# Patient Record
Sex: FEMALE | Race: ASIAN | NOT HISPANIC OR LATINO | URBAN - METROPOLITAN AREA
[De-identification: names, ages, dates, MRNs, and addresses within clinical notes are randomized per-mention and may not be internally consistent; named-entity substitution may affect disease eponyms.]

---

## 2017-04-21 ENCOUNTER — EMERGENCY (EMERGENCY)
Facility: HOSPITAL | Age: 42
LOS: 1 days | Discharge: HOME | End: 2017-04-21
Attending: EMERGENCY MEDICINE | Admitting: EMERGENCY MEDICINE
Payer: COMMERCIAL

## 2017-04-21 VITALS
OXYGEN SATURATION: 99 % | RESPIRATION RATE: 18 BRPM | DIASTOLIC BLOOD PRESSURE: 70 MMHG | WEIGHT: 175.93 LBS | TEMPERATURE: 98 F | HEART RATE: 70 BPM | SYSTOLIC BLOOD PRESSURE: 113 MMHG

## 2017-04-21 DIAGNOSIS — M54.9 DORSALGIA, UNSPECIFIED: ICD-10-CM

## 2017-04-21 PROCEDURE — 71046 X-RAY EXAM CHEST 2 VIEWS: CPT

## 2017-04-21 PROCEDURE — 93005 ELECTROCARDIOGRAM TRACING: CPT

## 2017-04-21 PROCEDURE — 99284 EMERGENCY DEPT VISIT MOD MDM: CPT | Mod: 25

## 2017-04-21 PROCEDURE — 93010 ELECTROCARDIOGRAM REPORT: CPT

## 2017-04-21 PROCEDURE — 99283 EMERGENCY DEPT VISIT LOW MDM: CPT | Mod: 25

## 2017-04-21 PROCEDURE — 71020: CPT | Mod: 26

## 2017-04-21 RX ORDER — KETOROLAC TROMETHAMINE 30 MG/ML
10 SYRINGE (ML) INJECTION ONCE
Qty: 0 | Refills: 0 | Status: DISCONTINUED | OUTPATIENT
Start: 2017-04-21 | End: 2017-04-21

## 2017-04-21 NOTE — ED PROVIDER NOTE - INTERPRETATION
normal sinus rhythm, Normal axis, Normal DE interval and QRS complex. There are no acute ischemic ST or T-wave changes.

## 2017-04-21 NOTE — ED PROVIDER NOTE - MEDICAL DECISION MAKING DETAILS
Mikayla Acharya, DO: 41F with R back pain x 1 mo & new-onset respiratory symptoms. PERC negative. VSS. Will do EKG to eval for pericarditis. Mikayla Acharya, DO: 41F with R back pain x 1 mo & new-onset respiratory symptoms. PERC negative. VSS. Will do EKG to eval for pericarditis. CXR to evaluate for rib fx/PTX. Hx & PE consistent with MSK pain - possible rib subluxation - mechanism low impact for fx. Mikayla Acharya, DO: 41F with R back pain x 1 mo & new-onset respiratory symptoms. PERC negative. VSS. Will do EKG to eval for pericarditis. CXR to evaluate for rib fx/PTX &/or PNA in setting of fx. Hx & PE consistent with MSK pain - possible rib subluxation - mechanism low impact for fx. Mikayla Acharya, DO: 41F with R back pain x 1 mo & new-onset respiratory symptoms. PERC negative. VSS. Will do EKG to eval for pericarditis. CXR to evaluate for rib fx/PTX &/or PNA in setting of fx. Hx & PE consistent with MSK pain - possible rib subluxation - mechanism low impact for fx.  Attending note-right posterior chest pain. Likely to be a fracture consider intercostal muscle spasm. X-ray

## 2017-04-21 NOTE — ED PROVIDER NOTE - OBJECTIVE STATEMENT
Mikayla Elizadarrian, DO: 41F with no PMH here for right sided back pain described as constant, worse with respiration. Back pain x 1 mo with new onset respiratory symptoms x 1 week. Trauma prior to onset of pain - filing cabinet fell anteriorly with no associated fall. Taking Ibuprofen 600mg & valium without relief. No recent travel/immobilization, hx of DVT/PE, recent surgeries, LE swelling, cough, exogenous estrogen use. No arthralgias, SOB, abdominal pain.   PMD: Dr. Kayla Kirk Mikayla Elizadarrian, DO: 41F with no PMH here for right sided back pain described as constant, worse with respiration. Back pain x 1 mo with new onset respiratory symptoms x 1 week. Trauma 1 week prior to onset of back pain - filing cabinet fell anteriorly with no associated fall. Taking Ibuprofen 600mg & valium without relief. No recent travel/immobilization, hx of DVT/PE, recent surgeries, LE swelling, cough, exogenous estrogen use. No arthralgias, SOB, abdominal pain.   PMD: Dr. Kayla Kirk Mikayla Acharya, DO: 41F with no PMH here for right sided back pain described as constant, worse with respiration. Back pain x 1 mo with new onset respiratory symptoms x 1 week. Trauma 1 week prior to onset of back pain - filing cabinet fell anteriorly with no associated fall. Taking Ibuprofen 600mg & valium without relief. No recent travel/immobilization, hx of DVT/PE, recent surgeries, LE swelling, cough, exogenous estrogen use. No arthralgias, SOB, abdominal pain.   PMD: Dr. Kayla Kirk       Attending note. Patient was seen in fast track from #4. Agree with the above. Patient states a cabinet fell on her proximally one month ago patient had no back pain initially. This is complaining of right posterior back pain which is sharp for approximately 2-3 weeks. Patient denies any shortness of breath. Exacerbated by movement, and deep inspiration, coughing. Patient denies any abdominal pain. She denies any prior lung problems. She denies any hematuria

## 2017-04-21 NOTE — ED PROVIDER NOTE - PHYSICAL EXAMINATION
Mikayla Acharya, DO:   Gen: NAD, AOx3, non-toxic  Head: NCAT  HEENT: PERRL, oral mucosa moist, normal conjunctiva  Lung: CTAB, no respiratory distress, no wheezes/rhonchi/rales B/L, speaking in full sentences.  CV: rrr, no murmurs, Normal perfusion  Abd: soft, NTND, no guarding, no CVA tenderness  MSK: No edema, no visible deformities  Neuro: No focal sensory or motor deficits  Skin: No rash   Psych: normal affect Mikayla Acharya, DO:   Gen: NAD, AOx3, non-toxic  Head: NCAT  HEENT: PERRL, oral mucosa moist, normal conjunctiva  Lung: CTAB, no respiratory distress, no wheezes/rhonchi/rales B/L, speaking in full sentences.  CV: rrr, no murmurs, Normal perfusion  Abd: soft, NTND, no guarding, no CVA tenderness  MSK: No edema, no visible deformities  Neuro: No focal sensory or motor deficits  Skin: No rash   Psych: normal affect       Attending note. Patient is alert and in moderate discomfort to do her right back pain. Patient has some tenderness over the right posterior ribs approximately #8 and 9. There is no CVA tenderness. Spinous processes are nontender. Lungs are clear and equal with some splinting on deep inspiration. Heart is regular rate and rhythm without murmur. Abdomen soft and nontender. Skin is normal and without lesions

## 2017-04-21 NOTE — ED PROVIDER NOTE - NS ED ROS FT
Mikayla Acharya DO: No fever, no chills, no SOB, no cough, no abdominal pain, no nausea, no vomiting, no joint pain, no rashes, no psychiatric issues, otherwise as HPI.

## 2018-06-08 ENCOUNTER — EMERGENCY (EMERGENCY)
Facility: HOSPITAL | Age: 43
LOS: 1 days | Discharge: HOME | End: 2018-06-08
Attending: EMERGENCY MEDICINE
Payer: COMMERCIAL

## 2018-06-08 VITALS
SYSTOLIC BLOOD PRESSURE: 130 MMHG | HEIGHT: 61 IN | HEART RATE: 68 BPM | OXYGEN SATURATION: 100 % | WEIGHT: 164.91 LBS | TEMPERATURE: 99 F | RESPIRATION RATE: 16 BRPM | DIASTOLIC BLOOD PRESSURE: 83 MMHG

## 2018-06-08 VITALS
DIASTOLIC BLOOD PRESSURE: 75 MMHG | OXYGEN SATURATION: 100 % | TEMPERATURE: 98 F | SYSTOLIC BLOOD PRESSURE: 115 MMHG | RESPIRATION RATE: 16 BRPM | HEART RATE: 60 BPM

## 2018-06-08 PROCEDURE — 99283 EMERGENCY DEPT VISIT LOW MDM: CPT

## 2018-06-08 PROCEDURE — 73564 X-RAY EXAM KNEE 4 OR MORE: CPT | Mod: 26,50

## 2018-06-08 PROCEDURE — 73564 X-RAY EXAM KNEE 4 OR MORE: CPT

## 2018-06-08 RX ORDER — IBUPROFEN 200 MG
600 TABLET ORAL ONCE
Qty: 0 | Refills: 0 | Status: COMPLETED | OUTPATIENT
Start: 2018-06-08 | End: 2018-06-08

## 2018-06-08 RX ORDER — ACETAMINOPHEN 500 MG
650 TABLET ORAL ONCE
Qty: 0 | Refills: 0 | Status: COMPLETED | OUTPATIENT
Start: 2018-06-08 | End: 2018-06-08

## 2018-06-08 RX ADMIN — Medication 600 MILLIGRAM(S): at 16:42

## 2018-06-08 RX ADMIN — Medication 650 MILLIGRAM(S): at 16:42

## 2018-06-08 NOTE — ED PROVIDER NOTE - PHYSICAL EXAMINATION
Gen: NAD  Eyes:  sclerae white, no icterus  ENT: Moist mucous membranes. No exudates  Abd: BS+, nondistended, No tenderness to palpation  Musculoskeletal:  TTP medial and lateral aspect of bilateral knees  Skin: no lesions or scars noted  Psych: mood good, affect full range and congruent with mood.  Neurologic: AAOx3 Gen: NAD  Eyes:  sclerae white, no icterus  ENT: Moist mucous membranes. No exudates  Abd: BS+, nondistended, No tenderness to palpation  Musculoskeletal:  TTP medial and lateral aspect of bilateral knees, no knee instability, no erythema  Skin: no lesions or scars noted  Psych: mood good, affect full range and congruent with mood.  Neurologic: AAOx3

## 2018-06-08 NOTE — ED PROVIDER NOTE - MEDICAL DECISION MAKING DETAILS
SOFIA Guy MD: Pt is a 41 y/o female with prior h/o b/l ligamentous knee surgery who p/w SOFIA Guy MD: Pt is a 43 y/o female with prior h/o b/l ligamentous knee surgery who p/w chronic knee pain x 1-2 mo. Denies recent trauma, states pain worse upon walking up stairs. Did not take anything for pain today. Denies focal numbness/weakness, leg/joint swelling. Pt is ambulatory. DDx: arthritis, ligamentous injury, bony injury. Plan: xrays, pain control, outpt Orthopedic f/u

## 2018-06-08 NOTE — ED ADULT NURSE NOTE - OBJECTIVE STATEMENT
43 y/o F pt, present to FT for bilateral atraumatic knee pain, has been for months now but progressively worsen, difficulty walking, pos ROM, pos peripheral pulse strong and present, ice placed to b/l knee, pain meds given as ordered

## 2018-06-08 NOTE — ED PROVIDER NOTE - OBJECTIVE STATEMENT
43 y/o F p/w bilat knee pain and swelling x 1 month, had surgery for ligamentous injury on both knees 5 yrs ago, no recent trauma, pain worse upon walking up stairs.

## 2018-06-08 NOTE — ED PROVIDER NOTE - PROGRESS NOTE DETAILS
Attending MD Ernandez: XR negative. Will ACE wrap for comfort.  Follow up instructions given with ortho list, importance of follow up emphasized, return to ED parameters reviewed and patient verbalized understanding.  All questions answered, all concerns addressed. Pain improved

## 2022-08-17 ENCOUNTER — EMERGENCY (EMERGENCY)
Facility: HOSPITAL | Age: 47
LOS: 1 days | Discharge: HOME | End: 2022-08-17
Attending: STUDENT IN AN ORGANIZED HEALTH CARE EDUCATION/TRAINING PROGRAM | Admitting: STUDENT IN AN ORGANIZED HEALTH CARE EDUCATION/TRAINING PROGRAM

## 2022-08-17 VITALS
TEMPERATURE: 98 F | OXYGEN SATURATION: 100 % | DIASTOLIC BLOOD PRESSURE: 82 MMHG | SYSTOLIC BLOOD PRESSURE: 143 MMHG | HEART RATE: 55 BPM

## 2022-08-17 VITALS
HEIGHT: 61 IN | RESPIRATION RATE: 17 BRPM | TEMPERATURE: 98 F | DIASTOLIC BLOOD PRESSURE: 86 MMHG | OXYGEN SATURATION: 100 % | SYSTOLIC BLOOD PRESSURE: 158 MMHG | HEART RATE: 67 BPM

## 2022-08-17 LAB
ALBUMIN SERPL ELPH-MCNC: 4.3 G/DL — SIGNIFICANT CHANGE UP (ref 3.3–5)
ALP SERPL-CCNC: 45 U/L — SIGNIFICANT CHANGE UP (ref 40–120)
ALT FLD-CCNC: 17 U/L — SIGNIFICANT CHANGE UP (ref 4–33)
ANION GAP SERPL CALC-SCNC: 12 MMOL/L — SIGNIFICANT CHANGE UP (ref 7–14)
APPEARANCE UR: CLEAR — SIGNIFICANT CHANGE UP
AST SERPL-CCNC: 17 U/L — SIGNIFICANT CHANGE UP (ref 4–32)
BACTERIA # UR AUTO: ABNORMAL
BASE EXCESS BLDV CALC-SCNC: 0.4 MMOL/L — SIGNIFICANT CHANGE UP (ref -2–3)
BASOPHILS # BLD AUTO: 0.07 K/UL — SIGNIFICANT CHANGE UP (ref 0–0.2)
BASOPHILS NFR BLD AUTO: 0.9 % — SIGNIFICANT CHANGE UP (ref 0–2)
BILIRUB SERPL-MCNC: 0.2 MG/DL — SIGNIFICANT CHANGE UP (ref 0.2–1.2)
BILIRUB UR-MCNC: NEGATIVE — SIGNIFICANT CHANGE UP
BLOOD GAS VENOUS COMPREHENSIVE RESULT: SIGNIFICANT CHANGE UP
BUN SERPL-MCNC: 18 MG/DL — SIGNIFICANT CHANGE UP (ref 7–23)
CALCIUM SERPL-MCNC: 9.5 MG/DL — SIGNIFICANT CHANGE UP (ref 8.4–10.5)
CHLORIDE BLDV-SCNC: 104 MMOL/L — SIGNIFICANT CHANGE UP (ref 96–108)
CHLORIDE SERPL-SCNC: 101 MMOL/L — SIGNIFICANT CHANGE UP (ref 98–107)
CO2 BLDV-SCNC: 28.9 MMOL/L — HIGH (ref 22–26)
CO2 SERPL-SCNC: 24 MMOL/L — SIGNIFICANT CHANGE UP (ref 22–31)
COLOR SPEC: YELLOW — SIGNIFICANT CHANGE UP
CREAT SERPL-MCNC: 0.74 MG/DL — SIGNIFICANT CHANGE UP (ref 0.5–1.3)
DIFF PNL FLD: NEGATIVE — SIGNIFICANT CHANGE UP
EGFR: 99 ML/MIN/1.73M2 — SIGNIFICANT CHANGE UP
EOSINOPHIL # BLD AUTO: 0.32 K/UL — SIGNIFICANT CHANGE UP (ref 0–0.5)
EOSINOPHIL NFR BLD AUTO: 3.9 % — SIGNIFICANT CHANGE UP (ref 0–6)
EPI CELLS # UR: 5 /HPF — SIGNIFICANT CHANGE UP (ref 0–5)
GAS PNL BLDV: 136 MMOL/L — SIGNIFICANT CHANGE UP (ref 136–145)
GAS PNL BLDV: SIGNIFICANT CHANGE UP
GLUCOSE BLDV-MCNC: 90 MG/DL — SIGNIFICANT CHANGE UP (ref 70–99)
GLUCOSE SERPL-MCNC: 90 MG/DL — SIGNIFICANT CHANGE UP (ref 70–99)
GLUCOSE UR QL: NEGATIVE — SIGNIFICANT CHANGE UP
HCO3 BLDV-SCNC: 27 MMOL/L — SIGNIFICANT CHANGE UP (ref 22–29)
HCT VFR BLD CALC: 38.4 % — SIGNIFICANT CHANGE UP (ref 34.5–45)
HCT VFR BLDA CALC: 36 % — SIGNIFICANT CHANGE UP (ref 34.5–46.5)
HGB BLD CALC-MCNC: 12.1 G/DL — SIGNIFICANT CHANGE UP (ref 11.5–15.5)
HGB BLD-MCNC: 12.2 G/DL — SIGNIFICANT CHANGE UP (ref 11.5–15.5)
HYALINE CASTS # UR AUTO: 3 /LPF — SIGNIFICANT CHANGE UP (ref 0–7)
IANC: 3.79 K/UL — SIGNIFICANT CHANGE UP (ref 1.8–7.4)
IMM GRANULOCYTES NFR BLD AUTO: 0.2 % — SIGNIFICANT CHANGE UP (ref 0–1.5)
KETONES UR-MCNC: NEGATIVE — SIGNIFICANT CHANGE UP
LACTATE BLDV-MCNC: 2.2 MMOL/L — HIGH (ref 0.5–2)
LEUKOCYTE ESTERASE UR-ACNC: ABNORMAL
LIDOCAIN IGE QN: 54 U/L — SIGNIFICANT CHANGE UP (ref 7–60)
LYMPHOCYTES # BLD AUTO: 3.42 K/UL — HIGH (ref 1–3.3)
LYMPHOCYTES # BLD AUTO: 41.7 % — SIGNIFICANT CHANGE UP (ref 13–44)
MAGNESIUM SERPL-MCNC: 1.9 MG/DL — SIGNIFICANT CHANGE UP (ref 1.6–2.6)
MCHC RBC-ENTMCNC: 24.5 PG — LOW (ref 27–34)
MCHC RBC-ENTMCNC: 31.8 GM/DL — LOW (ref 32–36)
MCV RBC AUTO: 77.3 FL — LOW (ref 80–100)
MONOCYTES # BLD AUTO: 0.59 K/UL — SIGNIFICANT CHANGE UP (ref 0–0.9)
MONOCYTES NFR BLD AUTO: 7.2 % — SIGNIFICANT CHANGE UP (ref 2–14)
NEUTROPHILS # BLD AUTO: 3.79 K/UL — SIGNIFICANT CHANGE UP (ref 1.8–7.4)
NEUTROPHILS NFR BLD AUTO: 46.1 % — SIGNIFICANT CHANGE UP (ref 43–77)
NITRITE UR-MCNC: NEGATIVE — SIGNIFICANT CHANGE UP
NRBC # BLD: 0 /100 WBCS — SIGNIFICANT CHANGE UP (ref 0–0)
NRBC # FLD: 0 K/UL — SIGNIFICANT CHANGE UP (ref 0–0)
PCO2 BLDV: 53 MMHG — HIGH (ref 39–42)
PH BLDV: 7.32 — SIGNIFICANT CHANGE UP (ref 7.32–7.43)
PH UR: 6 — SIGNIFICANT CHANGE UP (ref 5–8)
PHOSPHATE SERPL-MCNC: 4.4 MG/DL — SIGNIFICANT CHANGE UP (ref 2.5–4.5)
PLATELET # BLD AUTO: 281 K/UL — SIGNIFICANT CHANGE UP (ref 150–400)
PO2 BLDV: 52 MMHG — SIGNIFICANT CHANGE UP
POTASSIUM BLDV-SCNC: 4.1 MMOL/L — SIGNIFICANT CHANGE UP (ref 3.5–5.1)
POTASSIUM SERPL-MCNC: 4.3 MMOL/L — SIGNIFICANT CHANGE UP (ref 3.5–5.3)
POTASSIUM SERPL-SCNC: 4.3 MMOL/L — SIGNIFICANT CHANGE UP (ref 3.5–5.3)
PROT SERPL-MCNC: 7.6 G/DL — SIGNIFICANT CHANGE UP (ref 6–8.3)
PROT UR-MCNC: ABNORMAL
RBC # BLD: 4.97 M/UL — SIGNIFICANT CHANGE UP (ref 3.8–5.2)
RBC # FLD: 15.7 % — HIGH (ref 10.3–14.5)
RBC CASTS # UR COMP ASSIST: 1 /HPF — SIGNIFICANT CHANGE UP (ref 0–4)
SAO2 % BLDV: 84.7 % — SIGNIFICANT CHANGE UP
SODIUM SERPL-SCNC: 137 MMOL/L — SIGNIFICANT CHANGE UP (ref 135–145)
SP GR SPEC: 1.03 — SIGNIFICANT CHANGE UP (ref 1.01–1.05)
UROBILINOGEN FLD QL: SIGNIFICANT CHANGE UP
WBC # BLD: 8.21 K/UL — SIGNIFICANT CHANGE UP (ref 3.8–10.5)
WBC # FLD AUTO: 8.21 K/UL — SIGNIFICANT CHANGE UP (ref 3.8–10.5)
WBC UR QL: 35 /HPF — HIGH (ref 0–5)

## 2022-08-17 PROCEDURE — 99285 EMERGENCY DEPT VISIT HI MDM: CPT

## 2022-08-17 PROCEDURE — 74177 CT ABD & PELVIS W/CONTRAST: CPT | Mod: 26,MA

## 2022-08-17 RX ORDER — MORPHINE SULFATE 50 MG/1
4 CAPSULE, EXTENDED RELEASE ORAL ONCE
Refills: 0 | Status: DISCONTINUED | OUTPATIENT
Start: 2022-08-17 | End: 2022-08-17

## 2022-08-17 RX ORDER — CEFTRIAXONE 500 MG/1
1000 INJECTION, POWDER, FOR SOLUTION INTRAMUSCULAR; INTRAVENOUS ONCE
Refills: 0 | Status: COMPLETED | OUTPATIENT
Start: 2022-08-17 | End: 2022-08-17

## 2022-08-17 RX ORDER — FAMOTIDINE 10 MG/ML
20 INJECTION INTRAVENOUS DAILY
Refills: 0 | Status: DISCONTINUED | OUTPATIENT
Start: 2022-08-17 | End: 2022-08-21

## 2022-08-17 RX ORDER — CEFPODOXIME PROXETIL 100 MG
1 TABLET ORAL
Qty: 20 | Refills: 0
Start: 2022-08-17 | End: 2022-08-26

## 2022-08-17 RX ORDER — SODIUM CHLORIDE 9 MG/ML
1000 INJECTION INTRAMUSCULAR; INTRAVENOUS; SUBCUTANEOUS ONCE
Refills: 0 | Status: COMPLETED | OUTPATIENT
Start: 2022-08-17 | End: 2022-08-17

## 2022-08-17 RX ADMIN — MORPHINE SULFATE 4 MILLIGRAM(S): 50 CAPSULE, EXTENDED RELEASE ORAL at 17:14

## 2022-08-17 RX ADMIN — FAMOTIDINE 20 MILLIGRAM(S): 10 INJECTION INTRAVENOUS at 17:14

## 2022-08-17 RX ADMIN — Medication 30 MILLILITER(S): at 17:15

## 2022-08-17 RX ADMIN — SODIUM CHLORIDE 1000 MILLILITER(S): 9 INJECTION INTRAMUSCULAR; INTRAVENOUS; SUBCUTANEOUS at 17:15

## 2022-08-17 RX ADMIN — CEFTRIAXONE 100 MILLIGRAM(S): 500 INJECTION, POWDER, FOR SOLUTION INTRAMUSCULAR; INTRAVENOUS at 21:32

## 2022-08-17 NOTE — ED PROVIDER NOTE - PHYSICAL EXAMINATION
GENERAL: Sitting uncomfortably in bed in moderate distress  NEURO: Alert and Oriented to person, place, date and situation. Pupils symmetric, No ptosis. No facial asymmetry or dysarthria, no tremor noted.  HEENT: No conjunctival injection or scleral icterus.   CARD: Normal rate and regular rhythm, no murmurs and no gallops appreciated.  RESP: Clear to auscultation bilaterally, No wheezes, rales or rhonchi. Good respiratory effort.  ABD: Bowel sounds active. Nondistended, Soft with tenderness to palpation in RUQ, umbilicus and RLQ, with guarding, no rigidity. No masses appreciated.  BACK: R sided CVA tenderness  EXT: No pedal edema. 2+DP pulses bilaterally.  SKIN: No rashes, bruising or acute skin injuries on face, limbs, abdomen, chest or back

## 2022-08-17 NOTE — ED PROVIDER NOTE - PATIENT PORTAL LINK FT
You can access the FollowMyHealth Patient Portal offered by [facility] by registering at the following website: [email or web address]. By joining [employer]’s FollowMyHealth portal, you will also be able to view your health information using other applications (apps) compatible with our system.

## 2022-08-17 NOTE — ED PROVIDER NOTE - CLINICAL SUMMARY MEDICAL DECISION MAKING FREE TEXT BOX
47yF with no PMhx presenting with abdominal pain for 4 days, worsening. VItals normal, exam with R sided abdominal tenderness and CVA tenderness. DDx pyelo vs UTI vs gallbladder pathology vs appendix stumpitis. cbc, cmp, vbg, CT scan abd/pel with IV contrast.

## 2022-08-17 NOTE — ED PROVIDER NOTE - ATTENDING CONTRIBUTION TO CARE
46yo F ho appendectomy pw 4 days of mid abdominal radiating to ruq and r flank, was seen by pcp and given oxycodone, no asoc fevers chills, nausea vomiting, diarrhea, urinary sx  on exam diffusely tender abdomen, worst in ruq,  will check labs, CT pain contrl

## 2022-08-17 NOTE — ED PROVIDER NOTE - NS ED ROS FT
CONSTITUTIONAL - No fever, No weight change, No lightheadedness  SKIN - No rash  HEMATOLOGIC - No abnormal bleeding or bruising  EYES - No eye pain, No blurred vision  ENT - No change in hearing, No sore throat, No neck pain, No rhinorrhea, No ear pain  RESPIRATORY - No shortness of breath, No cough  CARDIAC -No chest pain, No palpitations  GI - (+) abdominal pain, (+) nausea, No vomiting, No diarrhea, No constipation  - No dysuria, no frequency, no hematuria.   MUSCULOSKELETAL - No joint pain, No swelling, No back pain  NEUROLOGIC - No numbness, No focal weakness, No headache, No dizziness

## 2022-08-17 NOTE — ED ADULT NURSE NOTE - OBJECTIVE STATEMENT
Received pt to intake 9, A+Ox4, ambulatory. C/O RUQ pain radiating to the right mid back. Respirations even and unlabored, normal work of breathing, no accessory muscle use, speaking in full clear uninterrupted sentences. ABD is soft, tender, non distended. Pt denies any chest pain, SOB, headache, dizziness,  fever, chills.  20G to RAC, Labs sent, Medicated as per MD, will continue to monitor.

## 2022-08-17 NOTE — ED PROVIDER NOTE - PROGRESS NOTE DETAILS
Neil JAFFE, EM/IM PGY-2: labs unremarkable, normal WBC and LFTs unremarkable. Pending cT scan. UA with UTI vs sterile pyuria, will treat if no other cause of pain found on CT.

## 2022-08-17 NOTE — ED ADULT TRIAGE NOTE - CHIEF COMPLAINT QUOTE
Pt c/o abdominal pain near umbilicus worse after eating. Pt denies nvd. States pain started Saturday. Denies PMH

## 2022-08-17 NOTE — ED PROVIDER NOTE - OBJECTIVE STATEMENT
47yF with no PMHx on no medications presenting with worsening abdominal pain for 4 days. The location of the pain started in the periumbilical region and has now spread to the RUQ and RLQ, radiating to the R sided back. It waxes and wanes in terms of intensity but is now mostly constant. Her PCP did an US yesterday "looking for a kidney stone" and was told it was normal and to take pain medications at home. She denies fever, chills, nausea, vomiting, diarrhea, dysuria or hematuria. She thinks that eating makes the pain slightly worse but is unsure. She has not taken medications for pain. She denies tobacco, alcohol, marijuana use. 47yF with no PMHx on no medications presenting with worsening abdominal pain for 4 days. The location of the pain started in the periumbilical region and has now spread to the RUQ and RLQ, radiating to the R sided back. It waxes and wanes in terms of intensity but is now mostly constant. She had her appendix out many years ago otherwise no abdominal surgeries. Her PCP did an US yesterday "looking for a kidney stone" and was told it was normal and to take pain medications at home. She denies fever, chills, nausea, vomiting, diarrhea, dysuria or hematuria. She thinks that eating makes the pain slightly worse but is unsure. She has not taken medications for pain. She denies tobacco, alcohol, marijuana use.

## 2022-08-19 LAB
CULTURE RESULTS: SIGNIFICANT CHANGE UP
SPECIMEN SOURCE: SIGNIFICANT CHANGE UP

## 2022-12-20 ENCOUNTER — NON-APPOINTMENT (OUTPATIENT)
Age: 47
End: 2022-12-20

## 2023-03-13 NOTE — ED PROVIDER NOTE - SHIFT CHANGE DETAILS
Patient Attending MD Ernandez: 42F with chr bl knee pain s/p lig injury laney sx, now with acute on chronic, XR pending read, then ACE likely and ortho follow up

## 2025-01-15 PROBLEM — Z00.00 ENCOUNTER FOR PREVENTIVE HEALTH EXAMINATION: Status: ACTIVE | Noted: 2025-01-15

## 2025-06-12 ENCOUNTER — EMERGENCY (EMERGENCY)
Facility: HOSPITAL | Age: 50
LOS: 1 days | End: 2025-06-12
Admitting: EMERGENCY MEDICINE
Payer: COMMERCIAL

## 2025-06-12 VITALS
OXYGEN SATURATION: 97 % | DIASTOLIC BLOOD PRESSURE: 81 MMHG | TEMPERATURE: 98 F | SYSTOLIC BLOOD PRESSURE: 140 MMHG | HEART RATE: 69 BPM | WEIGHT: 175.05 LBS | RESPIRATION RATE: 17 BRPM

## 2025-06-12 VITALS
HEART RATE: 65 BPM | SYSTOLIC BLOOD PRESSURE: 130 MMHG | RESPIRATION RATE: 16 BRPM | OXYGEN SATURATION: 100 % | TEMPERATURE: 98 F | DIASTOLIC BLOOD PRESSURE: 67 MMHG

## 2025-06-12 PROCEDURE — 70450 CT HEAD/BRAIN W/O DYE: CPT | Mod: 26

## 2025-06-12 PROCEDURE — 99284 EMERGENCY DEPT VISIT MOD MDM: CPT

## 2025-06-12 RX ORDER — METOCLOPRAMIDE HCL 10 MG
10 TABLET ORAL ONCE
Refills: 0 | Status: COMPLETED | OUTPATIENT
Start: 2025-06-12 | End: 2025-06-12

## 2025-06-12 RX ORDER — KETOROLAC TROMETHAMINE 30 MG/ML
15 INJECTION, SOLUTION INTRAMUSCULAR; INTRAVENOUS ONCE
Refills: 0 | Status: DISCONTINUED | OUTPATIENT
Start: 2025-06-12 | End: 2025-06-12

## 2025-06-12 RX ADMIN — KETOROLAC TROMETHAMINE 15 MILLIGRAM(S): 30 INJECTION, SOLUTION INTRAMUSCULAR; INTRAVENOUS at 19:52

## 2025-06-12 RX ADMIN — Medication 1000 MILLILITER(S): at 19:52

## 2025-06-12 RX ADMIN — Medication 10 MILLIGRAM(S): at 19:52

## 2025-06-12 NOTE — ED PROVIDER NOTE - CLINICAL SUMMARY MEDICAL DECISION MAKING FREE TEXT BOX
50 y/o female c/o neck pain and headache, typical for her normal tension headache however now not improving with tylenol. Pt is well appearing, nad, afebrile, no murmur, lungs cta b/l, abd soft nt nd, no neuro deficits, no midline spinal tenderness,- no red flags low concern for intracranial pathology, PMD sent pt to ER for CT scan, will treat pain and reassess.

## 2025-06-12 NOTE — ED ADULT NURSE NOTE - OBJECTIVE STATEMENT
pt reporting to the ED for headache X 3 days on R side. Pt is AOX4. Pt denies chest pain or SOB. PT respirations even an unlabored. Pt appears to be comfortable, in NAD. Pt denies fever, chills, n/v/d. Pt denies abdominal pain, dysuria, hematuria. medication administered as ordered. Gait steady.

## 2025-06-12 NOTE — ED PROVIDER NOTE - OBJECTIVE STATEMENT
48 y/o female pmh headaches c/o headache x 3 days. Pt admits to neck pain radiating up to her head, associated with blurry vision. Pt states that this is like her typical headache but just worse. Pt took tylenol with little relief. Pt went to her PMD today who sent pt to the ER for CT scan. Pt denies chest pain, sob, abd pain, n/v/d, numbness, tingling, weakness, dizziness, syncope, fever or chills.

## 2025-06-12 NOTE — ED PROVIDER NOTE - PATIENT PORTAL LINK FT
You can access the FollowMyHealth Patient Portal offered by [facility] by registering at the following website: [email or web address]. By joining [employer]’s FollowMyHealth portal, you will also be able to view your health information using other applications (apps) compatible with our system.

## 2025-06-12 NOTE — ED ADULT NURSE NOTE - AS PAIN REST
[Well Nourished] : well nourished [Alert] : alert [No Acute Distress] : no acute distress [Obese] : obese 5 (moderate pain) [Well Developed] : well developed [Normal Sclera/Conjunctiva] : normal sclera/conjunctiva [No Proptosis] : no proptosis [EOMI] : extra ocular movement intact [Normal Oropharynx] : the oropharynx was normal [Thyroid Not Enlarged] : the thyroid was not enlarged [No Thyroid Nodules] : no palpable thyroid nodules [No Accessory Muscle Use] : no accessory muscle use [No Respiratory Distress] : no respiratory distress [Clear to Auscultation] : lungs were clear to auscultation bilaterally [Normal S1, S2] : normal S1 and S2 [Regular Rhythm] : with a regular rhythm [Normal Rate] : heart rate was normal [No Edema] : no peripheral edema [Pedal Pulses Normal] : the pedal pulses are present [Normal Bowel Sounds] : normal bowel sounds [Not Distended] : not distended [Not Tender] : non-tender [Soft] : abdomen soft [Normal Anterior Cervical Nodes] : no anterior cervical lymphadenopathy [Normal Posterior Cervical Nodes] : no posterior cervical lymphadenopathy [No Spinal Tenderness] : no spinal tenderness [Spine Straight] : spine straight [No Stigmata of Cushings Syndrome] : no stigmata of Cushings Syndrome [Normal Gait] : normal gait [Normal Strength/Tone] : muscle strength and tone were normal [No Rash] : no rash [Normal Reflexes] : deep tendon reflexes were 2+ and symmetric [No Tremors] : no tremors [Oriented x3] : oriented to person, place, and time [Acanthosis Nigricans] : no acanthosis nigricans [Abdominal Striae] : no abdominal striae

## 2025-06-12 NOTE — ED ADULT TRIAGE NOTE - CHIEF COMPLAINT QUOTE
Pt c/o constant R sided tension headache with intermittent blurry vision worsening over the last 3 days. Sent by MD for CT. Denies any pertinent past medical Hx.
